# Patient Record
Sex: FEMALE | Race: BLACK OR AFRICAN AMERICAN | NOT HISPANIC OR LATINO
[De-identification: names, ages, dates, MRNs, and addresses within clinical notes are randomized per-mention and may not be internally consistent; named-entity substitution may affect disease eponyms.]

---

## 2017-10-17 ENCOUNTER — RESULT REVIEW (OUTPATIENT)
Age: 47
End: 2017-10-17

## 2018-03-09 ENCOUNTER — APPOINTMENT (OUTPATIENT)
Dept: SURGERY | Facility: CLINIC | Age: 48
End: 2018-03-09

## 2022-04-21 ENCOUNTER — APPOINTMENT (OUTPATIENT)
Dept: ORTHOPEDIC SURGERY | Facility: CLINIC | Age: 52
End: 2022-04-21
Payer: MEDICARE

## 2022-04-21 VITALS — WEIGHT: 180 LBS | BODY MASS INDEX: 31.89 KG/M2 | HEIGHT: 63 IN

## 2022-04-21 DIAGNOSIS — M75.42 IMPINGEMENT SYNDROME OF LEFT SHOULDER: ICD-10-CM

## 2022-04-21 DIAGNOSIS — M77.8 OTHER ENTHESOPATHIES, NOT ELSEWHERE CLASSIFIED: ICD-10-CM

## 2022-04-21 PROCEDURE — 99213 OFFICE O/P EST LOW 20 MIN: CPT

## 2022-04-21 PROCEDURE — 73030 X-RAY EXAM OF SHOULDER: CPT | Mod: LT

## 2022-04-21 RX ORDER — DICLOFENAC SODIUM 75 MG/1
75 TABLET, DELAYED RELEASE ORAL
Qty: 60 | Refills: 0 | Status: ACTIVE | COMMUNITY
Start: 2022-04-21 | End: 1900-01-01

## 2022-04-21 NOTE — DISCUSSION/SUMMARY
[Medication Risks Reviewed] : Medication risks reviewed [de-identified] : Case discussed, treatment options reviewed\par Offered CSI\par MRI to eval for cuff tear. \par \par \par  \par \par RE:  ANATOLIY JEAN \par \par Acct #- [id number] \par \par \par \par Attention:  Nurse Reviewer /Medical Director\par \par  \par Based on my patient's condition, I strongly believe that the MRI left shoulder is medically.necessary.  \par The patient has failed oral meds, injections and PT and conservative treatment in combination or by themselves and therefore needs the MRI.  \par The MRI will dictate further treatment t recommendations.\par Sincerely,\par \par \par Physician's signature\par \par \par (Include Title), MD

## 2022-04-21 NOTE — PHYSICAL EXAM
[5___] : external rotation 5[unfilled]/5 [] : decreased sensation around incision [Left] : left shoulder [There are no fractures, subluxations or dislocations. No significant abnormalities are seen] : There are no fractures, subluxations or dislocations. No significant abnormalities are seen [TWNoteComboBox7] : active forward flexion 160 degrees [TWNoteComboBox6] : internal rotation L1 [de-identified] : external rotation 60 degrees

## 2022-04-21 NOTE — HISTORY OF PRESENT ILLNESS
[8] : 8 [4] : 4 [Dull/Aching] : dull/aching [Nothing helps with pain getting better] : Nothing helps with pain getting better [de-identified] : 52 year old RHD female with pain in the left shoulder, symptoms started about three months ago, no recollection of injury/trauma. PAin with reaching over head, behind back and sleeping at night.  No radicular symptoms. Not taking any medication for the pain. On no meds [] : no [FreeTextEntry1] : left shoulder [FreeTextEntry3] : 3 months ago [FreeTextEntry5] : denies injury [de-identified] : activity

## 2022-04-25 ENCOUNTER — APPOINTMENT (OUTPATIENT)
Dept: MRI IMAGING | Facility: CLINIC | Age: 52
End: 2022-04-25

## 2022-04-28 ENCOUNTER — APPOINTMENT (OUTPATIENT)
Dept: ORTHOPEDIC SURGERY | Facility: CLINIC | Age: 52
End: 2022-04-28

## 2022-08-23 ENCOUNTER — APPOINTMENT (OUTPATIENT)
Dept: ORTHOPEDIC SURGERY | Facility: CLINIC | Age: 52
End: 2022-08-23

## 2022-08-23 VITALS — BODY MASS INDEX: 31.89 KG/M2 | WEIGHT: 180 LBS | HEIGHT: 63 IN

## 2022-08-23 DIAGNOSIS — Z96.651 PRESENCE OF RIGHT ARTIFICIAL KNEE JOINT: ICD-10-CM

## 2022-08-23 DIAGNOSIS — M70.51 OTHER BURSITIS OF KNEE, RIGHT KNEE: ICD-10-CM

## 2022-08-23 PROCEDURE — 99213 OFFICE O/P EST LOW 20 MIN: CPT

## 2022-08-23 NOTE — PHYSICAL EXAM
[5___] : hamstring 5[unfilled]/5 [Right] : right knee [AP] : anteroposterior [Lateral] : lateral [[place]] : skyline [Components well fixed, in good position] : Components well fixed, in good position [] : no calf tenderness [FreeTextEntry8] : sl [TWNoteComboBox7] : flexion 95 degrees [de-identified] : extension 0 degrees

## 2022-08-23 NOTE — DISCUSSION/SUMMARY
[de-identified] : Patient allowed to gently start resuming activities. \par Discussed change to medication prescription and usage. \par Bracing options discussed with patient. \par Activity modification as needed\par Name of Insurance\par Insurance Address:\par \par 08/23/2022 \par \par  \par \par RE:  ANATOLIY JEAN \par \par Acct #- [id number] \par \par \par Attention:  Nurse Reviewer /Medical Director\par \par I am writing this letter as a medical necessity for PT program.\par Patient has tried analgesics, non-steroid anti-inflammatory agents, \par hot or cold compresses,injections of corticosteroids, etc)  which in combination or by themselves has not worked.\par Based on my patient's condition, I strongly believe that the PT is medically needed.\par  \par Thank you for your time and consideration.   \par  \par \par \par \par

## 2022-08-23 NOTE — HISTORY OF PRESENT ILLNESS
[0] : 0 [Localized] : localized [Tightness] : tightness [Intermittent] : intermittent [de-identified] : 52 year old female with stiffness in the right knee, symptoms started about three weeks ago, history of TKA right knee 5 years ago at [facility] and she was doing relatively well until recently. She states she did have two surgeries for the right knee, the second was lysis of adhesions. Her left knee was replaced about 7 years ago and is doing well. No recent history of injury. [] : Post Surgical Visit: no [FreeTextEntry1] : right knee [FreeTextEntry5] : She had a knee replacement done about 5 years ago and has been experiencing locking in the right knee [FreeTextEntry6] : no pain, just stiffness [de-identified] : activity